# Patient Record
Sex: FEMALE | Race: WHITE | HISPANIC OR LATINO | URBAN - METROPOLITAN AREA
[De-identification: names, ages, dates, MRNs, and addresses within clinical notes are randomized per-mention and may not be internally consistent; named-entity substitution may affect disease eponyms.]

---

## 2019-02-20 ENCOUNTER — EMERGENCY (EMERGENCY)
Facility: HOSPITAL | Age: 29
LOS: 1 days | End: 2019-02-20
Admitting: EMERGENCY MEDICINE
Payer: MEDICAID

## 2019-02-20 PROCEDURE — 99284 EMERGENCY DEPT VISIT MOD MDM: CPT

## 2019-02-20 PROCEDURE — 70450 CT HEAD/BRAIN W/O DYE: CPT | Mod: 26

## 2021-04-07 PROBLEM — Z00.00 ENCOUNTER FOR PREVENTIVE HEALTH EXAMINATION: Status: ACTIVE | Noted: 2021-04-07

## 2021-04-13 ENCOUNTER — APPOINTMENT (OUTPATIENT)
Dept: OBGYN | Facility: CLINIC | Age: 31
End: 2021-04-13

## 2021-04-21 ENCOUNTER — APPOINTMENT (OUTPATIENT)
Dept: OBGYN | Facility: CLINIC | Age: 31
End: 2021-04-21
Payer: MEDICAID

## 2021-04-21 VITALS
SYSTOLIC BLOOD PRESSURE: 110 MMHG | BODY MASS INDEX: 26.58 KG/M2 | WEIGHT: 150 LBS | HEIGHT: 63 IN | DIASTOLIC BLOOD PRESSURE: 78 MMHG

## 2021-04-21 DIAGNOSIS — G43.009 MIGRAINE W/OUT AURA, NOT INTRACTABLE, W/OUT STATUS MIGRAINOSUS: ICD-10-CM

## 2021-04-21 DIAGNOSIS — Z78.9 OTHER SPECIFIED HEALTH STATUS: ICD-10-CM

## 2021-04-21 DIAGNOSIS — Z30.09 ENCOUNTER FOR OTHER GENERAL COUNSELING AND ADVICE ON CONTRACEPTION: ICD-10-CM

## 2021-04-21 PROCEDURE — 99072 ADDL SUPL MATRL&STAF TM PHE: CPT

## 2021-04-21 PROCEDURE — 99202 OFFICE O/P NEW SF 15 MIN: CPT

## 2021-04-21 RX ORDER — IBUPROFEN 200 MG/1
TABLET, COATED ORAL
Refills: 0 | Status: ACTIVE | COMMUNITY

## 2023-02-23 ENCOUNTER — APPOINTMENT (OUTPATIENT)
Dept: ANTEPARTUM | Facility: CLINIC | Age: 33
End: 2023-02-23
Payer: MEDICAID

## 2023-02-23 ENCOUNTER — ASOB RESULT (OUTPATIENT)
Age: 33
End: 2023-02-23

## 2023-02-23 PROBLEM — Z00.00 ENCOUNTER FOR PREVENTIVE HEALTH EXAMINATION: Status: ACTIVE | Noted: 2023-02-23

## 2023-02-23 PROCEDURE — 76811 OB US DETAILED SNGL FETUS: CPT

## 2023-02-23 PROCEDURE — 76817 TRANSVAGINAL US OBSTETRIC: CPT

## 2023-02-27 ENCOUNTER — APPOINTMENT (OUTPATIENT)
Dept: MATERNAL FETAL MEDICINE | Facility: CLINIC | Age: 33
End: 2023-02-27
Payer: MEDICAID

## 2023-02-27 ENCOUNTER — ASOB RESULT (OUTPATIENT)
Age: 33
End: 2023-02-27

## 2023-02-27 ENCOUNTER — APPOINTMENT (OUTPATIENT)
Dept: ANTEPARTUM | Facility: CLINIC | Age: 33
End: 2023-02-27
Payer: MEDICAID

## 2023-02-27 VITALS
OXYGEN SATURATION: 98 % | WEIGHT: 170 LBS | HEART RATE: 96 BPM | RESPIRATION RATE: 16 BRPM | DIASTOLIC BLOOD PRESSURE: 76 MMHG | HEIGHT: 64 IN | BODY MASS INDEX: 29.02 KG/M2 | SYSTOLIC BLOOD PRESSURE: 114 MMHG

## 2023-02-27 DIAGNOSIS — Z71.85 ENCOUNTER FOR IMMUNIZATION SAFETY COUNSELING: ICD-10-CM

## 2023-02-27 DIAGNOSIS — R10.2 OTHER SPECIFIED PREGNANCY RELATED CONDITIONS, SECOND TRIMESTER: ICD-10-CM

## 2023-02-27 DIAGNOSIS — Z3A.25 25 WEEKS GESTATION OF PREGNANCY: ICD-10-CM

## 2023-02-27 DIAGNOSIS — O09.32 SUPERVISION OF PREGNANCY WITH INSUFFICIENT ANTENATAL CARE, SECOND TRIMESTER: ICD-10-CM

## 2023-02-27 DIAGNOSIS — Z78.9 OTHER SPECIFIED HEALTH STATUS: ICD-10-CM

## 2023-02-27 DIAGNOSIS — O26.872 CERVICAL SHORTENING, SECOND TRIMESTER: ICD-10-CM

## 2023-02-27 DIAGNOSIS — O26.892 OTHER SPECIFIED PREGNANCY RELATED CONDITIONS, SECOND TRIMESTER: ICD-10-CM

## 2023-02-27 DIAGNOSIS — Z86.32 SUPERVISION OF PREGNANCY WITH OTHER POOR REPRODUCTIVE OR OBSTETRIC HISTORY, UNSPECIFIED TRIMESTER: ICD-10-CM

## 2023-02-27 DIAGNOSIS — O34.32 MATERNAL CARE FOR CERVICAL INCOMPETENCE, SECOND TRIMESTER: ICD-10-CM

## 2023-02-27 DIAGNOSIS — Z84.1 FAMILY HISTORY OF DISORDERS OF KIDNEY AND URETER: ICD-10-CM

## 2023-02-27 DIAGNOSIS — O09.299 SUPERVISION OF PREGNANCY WITH OTHER POOR REPRODUCTIVE OR OBSTETRIC HISTORY, UNSPECIFIED TRIMESTER: ICD-10-CM

## 2023-02-27 PROCEDURE — 76817 TRANSVAGINAL US OBSTETRIC: CPT

## 2023-02-27 PROCEDURE — 99205 OFFICE O/P NEW HI 60 MIN: CPT

## 2023-02-27 RX ORDER — PROGESTERONE 90 MG/1.125G
8 GEL VAGINAL DAILY
Qty: 2 | Refills: 4 | Status: ACTIVE | COMMUNITY
Start: 2023-02-27 | End: 1900-01-01

## 2023-02-27 RX ORDER — PRENATAL VIT NO.126/IRON/FOLIC 28MG-0.8MG
28-0.8 TABLET ORAL
Refills: 0 | Status: ACTIVE | COMMUNITY

## 2023-02-27 RX ORDER — PROGESTERONE 100 MG/1
100 INSERT VAGINAL
Qty: 60 | Refills: 2 | Status: ACTIVE | COMMUNITY
Start: 2023-02-27 | End: 1900-01-01

## 2023-02-27 NOTE — CHIEF COMPLAINT
[G ___] : G [unfilled] [P ___] : P [unfilled] [de-identified] : having cervical shortening in during a second trimester ultrasound examination and presenting late for prenatal care

## 2023-02-27 NOTE — ACTIVE PROBLEMS
[Diabetes Mellitus] : no diabetes mellitus [Hypertension] : no hypertension [Heart Disease] : no heart disease [Autoimmune Disease] : no autoimmune disease [Renal Disease] : no kidney disease, no UTI [Neurologic Disorder] : no neurologic disorder, no epilepsy [Psychiatric Disorders] : no psychiatric disorders [Depression] : no depression, no post partum depression [Hepatic Disorder] : no hepatitis, no liver disease [Thrombophlebitis] : no varicosities, no phlebitis [Trauma] : no trauma/violence [Thyroid Disorder] : no thyroid dysfunction [Blood Transfusion (___ Ml)] : no history of blood transfusion

## 2023-02-27 NOTE — DISCUSSION/SUMMARY
[FreeTextEntry1] : She is 25 weeks and 4 days gestation by her last menstrual period dates.\par \par Regarding her late entry into prenatal care, I told her that early prenatal care can identify risk factors and provide opportunities for early intervention to lower  birth rates and other pregnancy complications.\par \par Her first ultrasound was on 2023.  The fetal anatomy was reported to be within normal limits.  The cervix was reported to be short with cervical funneling.  The cervical length was reported as 0.6 cm.  Today's endovaginal ultrasound revealed cervical funneling and a short cervical length. I told her that cervical funneling and a short cervix increases the risk for having a  delivery. I told her that the  treatment for a short cervix in women with a gonzalez gestation without a prior spontaneous  birth is the administration of daily vaginal progesterone supplementation. The presence of dynamic cervical funneling and a short cervix at 25 weeks is not suggestive of cervical insufficiency.  Her gestational age is too advanced for the placement of an ultrasound indicated cerclage procedure. I told her that to my knowledge, there are no studies demonstrating that absolute bedrest prevents  birth.  Observing absolute bedrest can be associated with deep venous thrombosis and pulmonary embolism.   She was advised to limit her activities and not have sexual intercourse. I told her to avoid standing for prolonged periods of time, avoid heavy lifting and bending, and avoid emotional stress. She states that for the past 2 weeks she has been having intermittent back pain, pelvic pressure, and abdominal tightening.  She also complained of having a vaginal discharge.  I performed a speculum examination which revealed the external os to be minimally dilated with no visible membranes protruding from the external cervical os.  Normal leukorrhea seen. An affirm test was done in the office due to the history of vaginal discharge.  She was advised to present to the hospital if she developed increasing pelvic pressure, back pain, copious vaginal discharge, leaking of vaginal fluid, vaginal bleeding, abdominal tightening and abdominal cramping pain.   I gave her a prescription for Crinone 8% progesterone vaginal gel to apply to the vagina daily during the morning. She was advised to call my office if she has difficulties obtaining the vaginal progesterone medication. I recommended having a short course of oral Indomethacin therapy due to her history of back pain, pelvic pressure, and abdominal tightening with cervical funneling.  She was given a prescription for the indomethacin medication.  She was scheduled to return in 1 week for a cervical length examination. A Doppler study of the ductus arteriosus will be done when she returns next week for the cervical length measurement. \par \par I recommended having a glucola challenge test to screen for gestational diabetes as soon as possible due to the history of gestational diabetes during the prior pregnancy.  I recommend having the COVID 19 vaccine during pregnancy if not already vaccinated.  She told me that she was given the Pfizer vaccines during . I also recommend having a COVID 19 vaccine booster during pregnancy if vaccinated and no booster vaccine after 6 months from the last vaccine injection.  She has not had a COVID-19 vaccine booster.  I discussed the benefits and safety of having a COVID-19 vaccine booster during pregnancy.  I gave her literature in Turkish regarding the safety and benefits of having the COVID-19 vaccine during pregnancy.  She told me that she would like to have the COVID-19 vaccine booster as soon as possible.  She was advised to inquire at the [facility] regarding getting the COVID-19 booster vaccination or to go to her local pharmacy.  I recommended having the Tdap vaccine between 27 and 36 weeks of gestation to prevent pertussis infection in the  infant. I recommended having the flu vaccine during flu season (October through May).  She told me that she has not received the flu vaccine.  She should have serial ultrasounds to evaluate fetal growth and development.  \par

## 2023-02-27 NOTE — FAMILY HISTORY
[Age 35+ During Pregnancy] : not 35 or over during pregnancy [Reported Family History Of Birth Defects] : no congenital heart defects [Chris-Sachs Carrier] : no Chris-Sachs [Family History] : no mental retardation/autism [Reported Family History Of Genetic Disease] : no history of child defect in child of baby father

## 2023-02-27 NOTE — VITALS
[LMP (date): ___] : LMP was on [unfilled] [GA =___ Weeks] : which calculates to a GA of [unfilled] weeks [GA= ___ Days] : and [unfilled] day(s) [EMPERATRIZ by LMP (date): ___] : The calculated EMPERATRIZ by LMP is [unfilled] [By LMP] : this is the final EMPERATRIZ

## 2023-02-27 NOTE — OB HISTORY
[Pregnancy History] : patient received anesthesia [LMP: ___] : LMP: [unfilled] [EMPERATRIZ: ___] : EMPERATRIZ: [unfilled] [EGA: ___ wks] : EGA: [unfilled] wks [Spontaneous] : Spontaneous conception [Sonogram] : sonogram [at ___ wks] : at [unfilled] weeks [Definite:  ___ (Date)] : the last menstrual period was [unfilled] [Normal Amount/Duration] : was of a normal amount and duration [Regular Cycle Intervals] : periods have been regular [Frequency: Q ___ days] : menstrual periods occur approximately every [unfilled] days [Menarche Age: ____] : age at menarche was [unfilled] [___] : at [unfilled] weeks GA [FreeTextEntry1] : The first prenatal visit was on 2/8/2023.  First ultrasound examination was on 2/23/2023. [Spotting Between  Menses] : no spotting between menses [Menstrual Cramps] : no menstrual cramps [On BCP at conception] : the patient was not on BCP at conception

## 2023-03-01 LAB
CANDIDA VAG CYTO: NOT DETECTED
G VAGINALIS+PREV SP MTYP VAG QL MICRO: NOT DETECTED
T VAGINALIS VAG QL WET PREP: NOT DETECTED

## 2023-03-01 RX ORDER — INDOMETHACIN 25 MG/1
25 CAPSULE ORAL
Qty: 30 | Refills: 0 | Status: ACTIVE | COMMUNITY
Start: 2023-02-27 | End: 1900-01-01

## 2023-03-08 ENCOUNTER — APPOINTMENT (OUTPATIENT)
Dept: MATERNAL FETAL MEDICINE | Facility: CLINIC | Age: 33
End: 2023-03-08

## 2023-03-08 ENCOUNTER — ASOB RESULT (OUTPATIENT)
Age: 33
End: 2023-03-08

## 2023-03-08 ENCOUNTER — INPATIENT (INPATIENT)
Facility: HOSPITAL | Age: 33
LOS: 1 days | Discharge: HOME | DRG: 831 | End: 2023-03-10
Attending: OBSTETRICS & GYNECOLOGY | Admitting: OBSTETRICS & GYNECOLOGY
Payer: COMMERCIAL

## 2023-03-08 ENCOUNTER — APPOINTMENT (OUTPATIENT)
Dept: ANTEPARTUM | Facility: CLINIC | Age: 33
End: 2023-03-08
Payer: MEDICAID

## 2023-03-08 VITALS
HEART RATE: 105 BPM | WEIGHT: 167.99 LBS | RESPIRATION RATE: 16 BRPM | DIASTOLIC BLOOD PRESSURE: 62 MMHG | HEIGHT: 68.11 IN | SYSTOLIC BLOOD PRESSURE: 108 MMHG | TEMPERATURE: 98 F

## 2023-03-08 DIAGNOSIS — O26.893 OTHER SPECIFIED PREGNANCY RELATED CONDITIONS, THIRD TRIMESTER: ICD-10-CM

## 2023-03-08 DIAGNOSIS — Z98.890 OTHER SPECIFIED POSTPROCEDURAL STATES: Chronic | ICD-10-CM

## 2023-03-08 DIAGNOSIS — O47.02 FALSE LABOR BEFORE 37 COMPLETED WEEKS OF GESTATION, SECOND TRIMESTER: ICD-10-CM

## 2023-03-08 DIAGNOSIS — O34.30 MATERNAL CARE FOR CERVICAL INCOMPETENCE, UNSPECIFIED TRIMESTER: ICD-10-CM

## 2023-03-08 DIAGNOSIS — Z3A.26 26 WEEKS GESTATION OF PREGNANCY: ICD-10-CM

## 2023-03-08 DIAGNOSIS — Z00.00 ENCOUNTER FOR GENERAL ADULT MEDICAL EXAMINATION WITHOUT ABNORMAL FINDINGS: ICD-10-CM

## 2023-03-08 LAB
APPEARANCE UR: CLEAR — SIGNIFICANT CHANGE UP
BACTERIA # UR AUTO: ABNORMAL
BASOPHILS # BLD AUTO: 0.02 K/UL — SIGNIFICANT CHANGE UP (ref 0–0.2)
BASOPHILS NFR BLD AUTO: 0.2 % — SIGNIFICANT CHANGE UP (ref 0–2)
BILIRUB UR-MCNC: NEGATIVE — SIGNIFICANT CHANGE UP
BLD GP AB SCN SERPL QL: SIGNIFICANT CHANGE UP
COLOR SPEC: YELLOW — SIGNIFICANT CHANGE UP
DIFF PNL FLD: NEGATIVE — SIGNIFICANT CHANGE UP
EOSINOPHIL # BLD AUTO: 0.46 K/UL — SIGNIFICANT CHANGE UP (ref 0–0.5)
EOSINOPHIL NFR BLD AUTO: 4.7 % — SIGNIFICANT CHANGE UP (ref 0–6)
EPI CELLS # UR: ABNORMAL
GLUCOSE UR QL: NEGATIVE MG/DL — SIGNIFICANT CHANGE UP
HBV SURFACE AG SERPL QL IA: SIGNIFICANT CHANGE UP
HCT VFR BLD CALC: 35 % — SIGNIFICANT CHANGE UP (ref 34.5–45)
HGB BLD-MCNC: 12.1 G/DL — SIGNIFICANT CHANGE UP (ref 11.5–15.5)
HIV 1 & 2 AB SERPL IA.RAPID: SIGNIFICANT CHANGE UP
IMM GRANULOCYTES NFR BLD AUTO: 0.4 % — SIGNIFICANT CHANGE UP (ref 0–0.9)
KETONES UR-MCNC: NEGATIVE — SIGNIFICANT CHANGE UP
LEUKOCYTE ESTERASE UR-ACNC: ABNORMAL
LYMPHOCYTES # BLD AUTO: 2.69 K/UL — SIGNIFICANT CHANGE UP (ref 1–3.3)
LYMPHOCYTES # BLD AUTO: 27.6 % — SIGNIFICANT CHANGE UP (ref 13–44)
MCHC RBC-ENTMCNC: 30.1 PG — SIGNIFICANT CHANGE UP (ref 27–34)
MCHC RBC-ENTMCNC: 34.6 GM/DL — SIGNIFICANT CHANGE UP (ref 32–36)
MCV RBC AUTO: 87.1 FL — SIGNIFICANT CHANGE UP (ref 80–100)
MONOCYTES # BLD AUTO: 0.55 K/UL — SIGNIFICANT CHANGE UP (ref 0–0.9)
MONOCYTES NFR BLD AUTO: 5.6 % — SIGNIFICANT CHANGE UP (ref 2–14)
NEUTROPHILS # BLD AUTO: 5.99 K/UL — SIGNIFICANT CHANGE UP (ref 1.8–7.4)
NEUTROPHILS NFR BLD AUTO: 61.5 % — SIGNIFICANT CHANGE UP (ref 43–77)
NITRITE UR-MCNC: NEGATIVE — SIGNIFICANT CHANGE UP
PH UR: 7 — SIGNIFICANT CHANGE UP (ref 5–8)
PLATELET # BLD AUTO: 206 K/UL — SIGNIFICANT CHANGE UP (ref 150–400)
PROT UR-MCNC: NEGATIVE — SIGNIFICANT CHANGE UP
RBC # BLD: 4.02 M/UL — SIGNIFICANT CHANGE UP (ref 3.8–5.2)
RBC # FLD: 12.5 % — SIGNIFICANT CHANGE UP (ref 10.3–14.5)
RBC CASTS # UR COMP ASSIST: ABNORMAL /HPF (ref 0–4)
SARS-COV-2 RNA SPEC QL NAA+PROBE: DETECTED
SP GR SPEC: 1 — LOW (ref 1.01–1.02)
UROBILINOGEN FLD QL: NEGATIVE MG/DL — SIGNIFICANT CHANGE UP
WBC # BLD: 9.75 K/UL — SIGNIFICANT CHANGE UP (ref 3.8–10.5)
WBC # FLD AUTO: 9.75 K/UL — SIGNIFICANT CHANGE UP (ref 3.8–10.5)
WBC UR QL: SIGNIFICANT CHANGE UP /HPF (ref 0–5)

## 2023-03-08 PROCEDURE — 76817 TRANSVAGINAL US OBSTETRIC: CPT

## 2023-03-08 PROCEDURE — 93325 DOPPLER ECHO COLOR FLOW MAPG: CPT

## 2023-03-08 PROCEDURE — 76827 ECHO EXAM OF FETAL HEART: CPT

## 2023-03-08 RX ORDER — SODIUM CHLORIDE 9 MG/ML
1000 INJECTION, SOLUTION INTRAVENOUS
Refills: 0 | Status: COMPLETED | OUTPATIENT
Start: 2023-03-08 | End: 2023-03-08

## 2023-03-08 RX ORDER — PROGESTERONE 200 MG/1
100 CAPSULE, LIQUID FILLED ORAL AT BEDTIME
Refills: 0 | Status: DISCONTINUED | OUTPATIENT
Start: 2023-03-08 | End: 2023-03-09

## 2023-03-08 RX ADMIN — Medication 12 MILLIGRAM(S): at 18:33

## 2023-03-08 RX ADMIN — Medication 1 TABLET(S): at 19:02

## 2023-03-08 RX ADMIN — PROGESTERONE 100 MILLIGRAM(S): 200 CAPSULE, LIQUID FILLED ORAL at 22:45

## 2023-03-08 RX ADMIN — SODIUM CHLORIDE 100 MILLILITER(S): 9 INJECTION, SOLUTION INTRAVENOUS at 18:59

## 2023-03-08 NOTE — OB PROVIDER H&P - ATTENDING COMMENTS
32y  at 26w6d GA by LMP consistent with first trimester franca who is admitted after MFM appointment where she was found to have a internal os that appears dilated, confirmed with exam here in the setting of painless cervical dilation    -admit  -BMZ course  -MFM consult  -regular diet  -routine AP orders  -consents to be obtained  ppalos

## 2023-03-08 NOTE — CONSULT NOTE ADULT - PROBLEM SELECTOR RECOMMENDATION 9
-No labor complaints   -Prenatal labs ordered   -A1C pending   -NST q shift   -EFW 917g on   -ACS started as patient is at a high risk of  delivery   -MgSO4 for neuroprotection at this time as low suspicion for  delivery in 24 hours

## 2023-03-08 NOTE — CONSULT NOTE ADULT - ASSESSMENT
A/P: 32y  at 26w6d GA by LMP consistent with first trimester sono who is admitted for ACS in the setting of painless cervical dilation.     Discussed with Dr. Zamudio and Dr. Schilling.

## 2023-03-08 NOTE — OB PROVIDER H&P - NSHPPHYSICALEXAM_GEN_ALL_CORE
T(C): 36.8 (03-08-23 @ 17:30), Max: 36.8 (03-08-23 @ 17:30)  HR: 105 (03-08-23 @ 17:30) (105 - 105)  BP: 108/62 (03-08-23 @ 17:30) (108/62 - 108/62)  RR: 16 (03-08-23 @ 17:30) (16 - 16)    Gen: NAD, well-appearing   Abd: Soft, gravid  Ext: non-tender, non-edematous  SVE: 1.5/30/-3  SSE: cervix visualized, unable to see dilation due to vaginal walls, no bleeding or abnormal discharge noted   FHT: baseline 140s, moderate variability, +accels, -decels   [place]: no contractions

## 2023-03-08 NOTE — CONSULT NOTE ADULT - PROBLEM SELECTOR RECOMMENDATION 2
-No labor complaints   -Last cervical length 3.7cm today   -SVE 1.5/-3, now with painless cervical dilation   -NST q shift   -Vaginal cultures done  -UA and UCx done   -Continue progesterone suppository daily   -ACS started as patient is at a high risk of  delivery   -MgSO4 for neuroprotection at this time as low suspicion for  delivery in 24 hours

## 2023-03-08 NOTE — CONSULT NOTE ADULT - SUBJECTIVE AND OBJECTIVE BOX
32y  at 26w6d GA by LMP consistent with first trimester sono who was sent in from the office for ACS in the setting of cervical insufficiency. Patient denies vaginal bleeding, contractions and leakage of fluid. She endorses good fetal movement. Denies fevers, chills, nausea and vomiting. No other complaints at this time.   EMPERATRIZ: 23  LMP: 22  Prenatal course is significant for:  1. Shortened cervix: 0.5cm on TVUS on , s/p indomethacin for 1 week, progesterone unable to be obtained from pharmacy by patient   2. H/o GDM in prior pregnancy     POB:   G1 SAB with D&C   G2 full term uncomplicated    G3 full term uncomplicated    G4 full term uncomplicated , pregnancy significant for GDM  PGYN: -fibroids, -ovarian cysts, denies STD hx, denies abnormal PAPs   PMH: Denies  PSH: Denies  SH: Denies EtOH, tobacco and illicit drug use during this pregnancy; feels safe at home   Meds: PNVs  Allergies: NKDA    T(C): 36.8 (23 @ 17:30), Max: 36.8 (23 @ 17:30)  HR: 105 (23 @ 17:30) (105 - 105)  BP: 108/62 (23 @ 17:30) (108/62 - 108/62)  RR: 16 (23 @ 17:30) (16 - 16)    Gen: NAD, well-appearing   Abd: Soft, gravid  Ext: non-tender, non-edematous  SVE: 1.5/30/-3  SSE: cervix visualized, unable to see dilation due to vaginal walls, no bleeding or abnormal discharge noted   FHT: baseline 140s, moderate variability, +accels, -decels   [place]: no contractions    LABS:  PENDING

## 2023-03-08 NOTE — OB PROVIDER H&P - HISTORY OF PRESENT ILLNESS
32y  at 26w6d GA by LMP consistent with first trimester sono who was sent in from the office for ACS in the setting of cervical insufficiency. Patient denies vaginal bleeding, contractions and leakage of fluid. She endorses good fetal movement. Denies fevers, chills, nausea and vomiting. No other complaints at this time.   EMPERATRIZ: 23  LMP: 22  Prenatal course is significant for:  1. Shortened cervix: 0.5cm on TVUS on , s/p indomethacin for 1 week, progesterone unable to be obtained from pharmacy by patient   2. H/o GDM in prior pregnancy     POB:   G1 SAB with D&C   G2 full term uncomplicated    G3 full term uncomplicated    G4 full term uncomplicated , pregnancy significant for GDM  PGYN: -fibroids, -ovarian cysts, denies STD hx, denies abnormal PAPs   PMH: Denies  PSH: Denies  SH: Denies EtOH, tobacco and illicit drug use during this pregnancy; feels safe at home   Meds: PNVs  Allergies: NKDA

## 2023-03-08 NOTE — OB PROVIDER H&P - ASSESSMENT
A/P: 32y  at 26w6d GA by LMP consistent with first trimester sono who is admitted for ACS in the setting of painless cervical dilation.   -Admit to L&D  -Consent  -Admission labs, prenatal labs, A1C   -Regular diet   -Reactive NST, no contractions on toco   -ACS started   -MFM consulted     Discussed with Dr. Walker.

## 2023-03-08 NOTE — OB RN PATIENT PROFILE - FALL HARM RISK - UNIVERSAL INTERVENTIONS
-- Message is from the [facility]--    Patient is requesting a medication refill - the medication was not listed on the patient's active medication list.    Mom states when she barely touches the ear it hurts the patient. Mom states that a voice message can be left if she doesn't answer. Mom states that she has bad reception where she is at.      Prescribing Provider: Germania Lama MD    RX Name:  CIprodex  Dose:  0.3-0.1 % Otic Suspension  Quantity:  Npt p[rovided  Instruction(s):  Instill 4 drops in the affected ears twice daily    Duration: n/a days    Pharmacy  [facility] [address] [facility] [place] [address] [facility]    Patient confirmed the above pharmacy as correct?  Yes    Caller Information       Type Contact Phone    07/03/2019 08:10 AM Phone (Incoming) IVETTE FOSS (Mother) [phone] (W)          Alternative phone number: [phone] Please leave a voice message if no answer    Turnaround time given to caller:   \"This message will be sent to [state Provider's name]. The clinical team will fulfill your request as soon as they review your message.\"  
Bed in lowest position, wheels locked, appropriate side rails in place/Call bell, personal items and telephone in reach/Instruct patient to call for assistance before getting out of bed or chair/Non-slip footwear when patient is out of bed/[place] to call system/Physically safe environment - no spills, clutter or unnecessary equipment/Purposeful Proactive Rounding/Room/bathroom lighting operational, light cord in reach

## 2023-03-09 ENCOUNTER — TRANSCRIPTION ENCOUNTER (OUTPATIENT)
Age: 33
End: 2023-03-09

## 2023-03-09 DIAGNOSIS — Z3A.27 27 WEEKS GESTATION OF PREGNANCY: ICD-10-CM

## 2023-03-09 LAB
C TRACH RRNA SPEC QL NAA+PROBE: SIGNIFICANT CHANGE UP
CANDIDA AB TITR SER: SIGNIFICANT CHANGE UP
COVID-19 SPIKE DOMAIN AB INTERP: POSITIVE
COVID-19 SPIKE DOMAIN ANTIBODY RESULT: >250 U/ML — HIGH
CULTURE RESULTS: SIGNIFICANT CHANGE UP
G VAGINALIS DNA SPEC QL NAA+PROBE: SIGNIFICANT CHANGE UP
HIV 1+2 AB+HIV1 P24 AG SERPL QL IA: SIGNIFICANT CHANGE UP
N GONORRHOEA RRNA SPEC QL NAA+PROBE: SIGNIFICANT CHANGE UP
RUBV IGG SER-ACNC: 14.3 INDEX — SIGNIFICANT CHANGE UP
RUBV IGG SER-IMP: POSITIVE — SIGNIFICANT CHANGE UP
SARS-COV-2 IGG+IGM SERPL QL IA: >250 U/ML — HIGH
SARS-COV-2 IGG+IGM SERPL QL IA: POSITIVE
SPECIMEN SOURCE: SIGNIFICANT CHANGE UP
SPECIMEN SOURCE: SIGNIFICANT CHANGE UP
T PALLIDUM AB TITR SER: NEGATIVE — SIGNIFICANT CHANGE UP
T VAGINALIS SPEC QL WET PREP: SIGNIFICANT CHANGE UP

## 2023-03-09 PROCEDURE — 99232 SBSQ HOSP IP/OBS MODERATE 35: CPT | Mod: GC

## 2023-03-09 RX ORDER — ACETAMINOPHEN 500 MG
975 TABLET ORAL EVERY 6 HOURS
Refills: 0 | Status: DISCONTINUED | OUTPATIENT
Start: 2023-03-09 | End: 2023-03-10

## 2023-03-09 RX ORDER — FAMOTIDINE 10 MG/ML
20 INJECTION INTRAVENOUS DAILY
Refills: 0 | Status: DISCONTINUED | OUTPATIENT
Start: 2023-03-09 | End: 2023-03-10

## 2023-03-09 RX ORDER — CALCIUM CARBONATE 500(1250)
1 TABLET ORAL EVERY 4 HOURS
Refills: 0 | Status: DISCONTINUED | OUTPATIENT
Start: 2023-03-09 | End: 2023-03-10

## 2023-03-09 RX ORDER — LANOLIN ALCOHOL/MO/W.PET/CERES
1 CREAM (GRAM) TOPICAL AT BEDTIME
Refills: 0 | Status: DISCONTINUED | OUTPATIENT
Start: 2023-03-09 | End: 2023-03-10

## 2023-03-09 RX ORDER — DIPHENHYDRAMINE HCL 50 MG
25 CAPSULE ORAL ONCE
Refills: 0 | Status: COMPLETED | OUTPATIENT
Start: 2023-03-09 | End: 2023-03-09

## 2023-03-09 RX ORDER — ACETAMINOPHEN 500 MG
975 TABLET ORAL ONCE
Refills: 0 | Status: COMPLETED | OUTPATIENT
Start: 2023-03-09 | End: 2023-03-09

## 2023-03-09 RX ADMIN — Medication 975 MILLIGRAM(S): at 02:03

## 2023-03-09 RX ADMIN — Medication 25 MILLIGRAM(S): at 23:13

## 2023-03-09 RX ADMIN — Medication 975 MILLIGRAM(S): at 03:00

## 2023-03-09 RX ADMIN — Medication 12 MILLIGRAM(S): at 17:36

## 2023-03-09 RX ADMIN — Medication 1 TABLET(S): at 11:34

## 2023-03-09 RX ADMIN — Medication 975 MILLIGRAM(S): at 17:51

## 2023-03-09 RX ADMIN — Medication 975 MILLIGRAM(S): at 11:34

## 2023-03-09 RX ADMIN — FAMOTIDINE 20 MILLIGRAM(S): 10 INJECTION INTRAVENOUS at 12:50

## 2023-03-09 RX ADMIN — Medication 1 TABLET(S): at 11:35

## 2023-03-09 NOTE — DISCHARGE NOTE ANTEPARTUM - PLAN OF CARE
Patient should transition to regular activity level. Resume regular diet. Please call your provider to schedule follow-up visit. If you develop painful contractions, have vaginal bleeding, LOF or decreased fetal movement, please call your provider or return to the hospital.

## 2023-03-09 NOTE — DISCHARGE NOTE ANTEPARTUM - CARE PLAN
Principal Discharge DX:	Cervical insufficiency in pregnancy, antepartum  Assessment and plan of treatment:	Patient should transition to regular activity level. Resume regular diet. Please call your provider to schedule follow-up visit. If you develop painful contractions, have vaginal bleeding, LOF or decreased fetal movement, please call your provider or return to the hospital.   1

## 2023-03-09 NOTE — DISCHARGE NOTE ANTEPARTUM - MEDICATION SUMMARY - MEDICATIONS TO TAKE
I will START or STAY ON the medications listed below when I get home from the hospital:    Pepcid 20 mg oral tablet  -- 1 tab(s) by mouth once a day   -- It is very important that you take or use this exactly as directed.  Do not skip doses or discontinue unless directed by your doctor.  Obtain medical advice before taking any non-prescription drugs as some may affect the action of this medication.    -- Indication: For 27 weeks gestation of pregnancy    Prenatal Multivitamins with Folic Acid 1 mg oral tablet  -- 1 tab(s) by mouth once a day  -- Indication: For 27 weeks gestation of pregnancy

## 2023-03-09 NOTE — PROGRESS NOTE ADULT - PROBLEM SELECTOR PLAN 1
-No labor complaints   -H/o short cervix this pregnancy- 0.5cm on TVUS on , s/p indomethacin for 1 week, progesterone unable to be obtained from pharmacy by patient. Continue progesterone suppository daily   -SVE 1./-3, now with painless cervical dilation   -NST q shift   -Vaginal cultures done- f/u  -UA clear; UCx pending  -ACS started as patient is at a high risk of  delivery   -No MgSO4 for neuroprotection at this time as low suspicion for  delivery in 24 hours.

## 2023-03-09 NOTE — DISCHARGE NOTE ANTEPARTUM - CARE PROVIDER_API CALL
Arben Iniguez)  Obstetrics and Gynecology  [address] [place] [address]  Phone: [phone]  Fax: [phone]  Follow Up Time:

## 2023-03-09 NOTE — DISCHARGE NOTE ANTEPARTUM - HOSPITAL COURSE
32y  at 27.1wks GA by LMP consistent with first trimester sono admitted for ACS in the setting of painless cervical dilation. On the day of discharge pt is betamethasone complete, cervical exam is unchanged and  testing is reassuring. Pt is stable for discharge.

## 2023-03-09 NOTE — DISCHARGE NOTE ANTEPARTUM - PATIENT PORTAL LINK FT
You can access the FollowMyHealth Patient Portal offered by [facility] by registering at the following website: [email or web address]. By joining [employer]’s FollowMyHealth portal, you will also be able to view your health information using other applications (apps) compatible with our system.

## 2023-03-10 ENCOUNTER — TRANSCRIPTION ENCOUNTER (OUTPATIENT)
Age: 33
End: 2023-03-10

## 2023-03-10 VITALS
HEART RATE: 100 BPM | RESPIRATION RATE: 18 BRPM | DIASTOLIC BLOOD PRESSURE: 58 MMHG | SYSTOLIC BLOOD PRESSURE: 95 MMHG | OXYGEN SATURATION: 98 % | TEMPERATURE: 98 F

## 2023-03-10 LAB
A1C WITH ESTIMATED AVERAGE GLUCOSE RESULT: 4.8 % — SIGNIFICANT CHANGE UP (ref 4–5.6)
CULTURE RESULTS: SIGNIFICANT CHANGE UP
ESTIMATED AVERAGE GLUCOSE: 91 MG/DL — SIGNIFICANT CHANGE UP (ref 68–114)
MEV IGG SER-ACNC: 15.7 AU/ML — SIGNIFICANT CHANGE UP
MEV IGG+IGM SER-IMP: SIGNIFICANT CHANGE UP
SPECIMEN SOURCE: SIGNIFICANT CHANGE UP

## 2023-03-10 PROCEDURE — 86769 SARS-COV-2 COVID-19 ANTIBODY: CPT

## 2023-03-10 PROCEDURE — 87491 CHLMYD TRACH DNA AMP PROBE: CPT

## 2023-03-10 PROCEDURE — 86901 BLOOD TYPING SEROLOGIC RH(D): CPT

## 2023-03-10 PROCEDURE — 86900 BLOOD TYPING SEROLOGIC ABO: CPT

## 2023-03-10 PROCEDURE — 81001 URINALYSIS AUTO W/SCOPE: CPT

## 2023-03-10 PROCEDURE — 87086 URINE CULTURE/COLONY COUNT: CPT

## 2023-03-10 PROCEDURE — 85025 COMPLETE CBC W/AUTO DIFF WBC: CPT

## 2023-03-10 PROCEDURE — 36415 COLL VENOUS BLD VENIPUNCTURE: CPT

## 2023-03-10 PROCEDURE — U0005: CPT

## 2023-03-10 PROCEDURE — 87070 CULTURE OTHR SPECIMN AEROBIC: CPT

## 2023-03-10 PROCEDURE — 86703 HIV-1/HIV-2 1 RESULT ANTBDY: CPT

## 2023-03-10 PROCEDURE — 83036 HEMOGLOBIN GLYCOSYLATED A1C: CPT

## 2023-03-10 PROCEDURE — 87800 DETECT AGNT MULT DNA DIREC: CPT

## 2023-03-10 PROCEDURE — 87389 HIV-1 AG W/HIV-1&-2 AB AG IA: CPT

## 2023-03-10 PROCEDURE — 86850 RBC ANTIBODY SCREEN: CPT

## 2023-03-10 PROCEDURE — 86765 RUBEOLA ANTIBODY: CPT

## 2023-03-10 PROCEDURE — 87591 N.GONORRHOEAE DNA AMP PROB: CPT

## 2023-03-10 PROCEDURE — 86762 RUBELLA ANTIBODY: CPT

## 2023-03-10 PROCEDURE — 99231 SBSQ HOSP IP/OBS SF/LOW 25: CPT | Mod: GC

## 2023-03-10 PROCEDURE — 87340 HEPATITIS B SURFACE AG IA: CPT

## 2023-03-10 PROCEDURE — 86780 TREPONEMA PALLIDUM: CPT

## 2023-03-10 PROCEDURE — U0003: CPT

## 2023-03-10 RX ORDER — FAMOTIDINE 10 MG/ML
1 INJECTION INTRAVENOUS
Qty: 14 | Refills: 0
Start: 2023-03-10 | End: 2023-03-23

## 2023-03-10 NOTE — PROGRESS NOTE ADULT - PROBLEM SELECTOR PLAN 3
-No labor complaints   -Prenatal labs ordered   -H/o GDMA previous preg; A1C pending   -NST q shift   -EFW 917g on 2/24.
-No labor complaints   -Prenatal labs ordered   -H/o GDMA previous preg; A1C pending   -NST q shift   -EFW 917g on 2/24

## 2023-03-10 NOTE — CHART NOTE - NSCHARTNOTESELECT_GEN_ALL_CORE
NST Review/Event Note
Rx listed below.  Preferred pharmacy has been set up and verified.        
Event Note

## 2023-03-10 NOTE — DISCHARGE NOTE NURSING/CASE MANAGEMENT/SOCIAL WORK - PATIENT PORTAL LINK FT
You can access the FollowMyHealth Patient Portal offered by [facility] by registering at the following website: [email or web address]. By joining [employer]s FollowMyHealth portal, you will also be able to view your health information using other applications (apps) compatible with our system.

## 2023-03-10 NOTE — CHART NOTE - NSCHARTNOTEFT_GEN_A_CORE
32y  at 27.1wks GA by LMP consistent with first trimester sono who is admitted for ACS in the setting of painless cervical dilation.   Pt with no complaints. SSE: multiparous cervix visualized, without any signs of bleeding or drainage, no pooling. On digitial exam /-3.   No cervical changed noted from admission. Pt is stable for d/c home. Return precautions reviewed. Pt expressed understanding.
NST reviewed and approved by Dr. Walker  130/moderate variability/+accels/-decels

## 2023-03-10 NOTE — PROGRESS NOTE ADULT - ATTENDING COMMENTS
MFM - IUP at 27 weeks admitted for  corticosteroids secondary to progressive cervical shortening and painless cervical dilation.  Also noted to by COVID positive on admission testing. Denies symptoms or sick contacts.  This am patient reports a mild headache and heartburn which has been persistent throughout the pregnancy.  She reports no abdominal pain or pelvic pressure.  FHRT reassuring without contractions.  We reviewed the finding of short cervix and cervical dilation and increased risk for  delivery.  Plan to complete betamethasone.  No tocolysis or MGSO4 indicated at this time.  Vaginal cultures pending.  Recommend discontinue vaginal progesterone secondary to cervical dilation.  Regarding COVID+, patient does not have symptoms and unsure of any exposure.  We reviewed available of Paxlovid therapy to prevent progression of symptoms.  Patient declines at this time.  Continue supportive care.  Pepcid for heartburn and Tylenol for headache.  Continue inpatient management.   Evy Warren MD  Maternal Fetal Medicine
MFM Attending    32 year old  at admitted at 27 1/7 weeks with painless cervical dilation, received betamethasone course. Denies cramping, leaking, bleeding. Reports good fetal movement.    If cervical exam unchanged, plan to d/c home with close follow-up.  labor precautions discussed.    HAYDEN Wallace

## 2023-03-10 NOTE — DISCHARGE NOTE NURSING/CASE MANAGEMENT/SOCIAL WORK - NSDCPEFALRISK_GEN_ALL_CORE
For information on Fall & Injury Prevention, visit: [email or web address] OR  [email or web address] OR  https://www.cdc.gov/steadi/patient.html

## 2023-03-10 NOTE — PROGRESS NOTE ADULT - PROBLEM SELECTOR PLAN 1
-No labor complaints   -H/o short cervix this pregnancy- 0.5cm on TVUS on , s/p indomethacin for 1 week   -SVE 1.5/-3, now with painless cervical dilation   -NST q shift   -Vaginal cultures done  -UA clear; UCx neg  -ACS started as patient is at a high risk of  delivery   -No MgSO4 for neuroprotection at this time as low suspicion for  delivery in 24 hours.

## 2023-03-10 NOTE — PROGRESS NOTE ADULT - SUBJECTIVE AND OBJECTIVE BOX
JOSÉ ANTONIO SHOEMAKER  [facility] 2EST  01  32y  at 27.1wks GA by LMP consistent with first trimester sono who is admitted for ACS in the setting of painless cervical dilation.     Pt seen and examined. No complains. She denies any fevers, chills, sob, URI symptoms, ctx, VB, LOF and reports good fetal movement.      Vital Signs:  Vital Signs Last 24 Hrs  T(C): 37 (10 Mar 2023 05:50), Max: 37 (10 Mar 2023 05:50)  T(F): 98.6 (10 Mar 2023 05:50), Max: 98.6 (10 Mar 2023 05:50)  HR: 108 (10 Mar 2023 05:50) (70 - 108)  BP: 96/61 (10 Mar 2023 05:50) (94/55 - 116/75)  RR: 18 (10 Mar 2023 05:50) (18 - 18)  SpO2: 97% (10 Mar 2023 05:50) (95% - 100%)    Parameters below as of 10 Mar 2023 05:50  Patient On (Oxygen Delivery Method): room air        Physical Exam:  General: Adult female in NAD  Abdomen: soft, non-tender, gravid uterus  Ext: No cyanosis, edema or calf tenderness  Skin: No rashes or lesions on exposed skin        Labs:                          12.1   9.75  )-----------( 206      ( 08 Mar 2023 18:32 )             35.0             Culture - Genital (collected 23 @ 18:32)  Source: .Genital Vaginal/Rectal  Preliminary Report (23 @ 20:28):    Routine vaginal maureen    Culture - Urine (collected 23 @ 18:32)  Source: Clean Catch Clean Catch (Midstream)  Final Report (23 @ 22:30):    <10,000 CFU/mL Normal Urogenital Maureen          MEDICATIONS  (STANDING):  famotidine Injectable 20 milliGRAM(s) IV Push daily  melatonin 1 milliGRAM(s) Oral at bedtime  prenatal multivitamin 1 Tablet(s) Oral daily    MEDICATIONS  (PRN):  acetaminophen     Tablet .. 975 milliGRAM(s) Oral every 6 hours PRN Mild Pain (1 - 3)  calcium carbonate    500 mG (Tums) Chewable 1 Tablet(s) Chew every 4 hours PRN Heartburn  
JOSÉ ANTONIO SHOEMAKER  [facility] 2EST  01  32y  at 27wks GA by LMP consistent with first trimester sono who is admitted for ACS in the setting of painless cervical dilation.     Pt seen and examined. Reports migraine this am. She denies any fevers, chills, sob, ctx, VB, LOF and reports good fetal movement.       Vital Signs:  Vital Signs Last 24 Hrs  T(C): 36.7 (09 Mar 2023 08:11), Max: 37.1 (09 Mar 2023 04:22)  T(F): 98.1 (09 Mar 2023 08:11), Max: 98.8 (09 Mar 2023 04:22)  HR: 70 (09 Mar 2023 08:11) (70 - 106)  BP: 115/60 (09 Mar 2023 08:11) (104/64 - 115/60)  RR: 18 (09 Mar 2023 08:11) (16 - 18)  SpO2: 99% (09 Mar 2023 08:11) (98% - 99%)    Parameters below as of 09 Mar 2023 08:11  Patient On (Oxygen Delivery Method): room air      Height (cm): 173 (23 @ 17:29)  Weight (kg): 76.2 (23 @ 17:29)  BMI (kg/m2): 25.5 (23 @ 17:29)  BSA (m2): 1.9 (23 @ 17:29)    Physical Exam:  General: Adult female in NAD  Abdomen: soft, non-tender, gravid uterus  Ext: No cyanosis, edema or calf tenderness  Skin: No rashes or lesions on exposed skin      Labs:                          12.1   9.75  )-----------( 206      ( 08 Mar 2023 18:32 )             35.0         MEDICATIONS  (STANDING):  betamethasone Injectable 12 milliGRAM(s) IntraMuscular every 24 hours  prenatal multivitamin 1 Tablet(s) Oral daily  progesterone Vaginal Insert 100 milliGRAM(s) Vaginal at bedtime    MEDICATIONS  (PRN):  acetaminophen     Tablet .. 975 milliGRAM(s) Oral every 6 hours PRN Mild Pain (1 - 3)

## 2023-03-10 NOTE — PROGRESS NOTE ADULT - ASSESSMENT
32y  at 27wks GA by LMP consistent with first trimester sono who is admitted for ACS in the setting of painless cervical dilation. 
32y  at 27.1wks GA by LMP consistent with first trimester sono who is admitted for ACS in the setting of painless cervical dilation.

## 2023-03-15 PROBLEM — G43.909 MIGRAINE, UNSPECIFIED, NOT INTRACTABLE, WITHOUT STATUS MIGRAINOSUS: Chronic | Status: ACTIVE | Noted: 2023-03-08

## 2023-03-22 ENCOUNTER — APPOINTMENT (OUTPATIENT)
Dept: ANTEPARTUM | Facility: CLINIC | Age: 33
End: 2023-03-22
Payer: MEDICAID

## 2023-03-22 ENCOUNTER — ASOB RESULT (OUTPATIENT)
Age: 33
End: 2023-03-22

## 2023-03-22 PROCEDURE — 76817 TRANSVAGINAL US OBSTETRIC: CPT

## 2023-03-22 PROCEDURE — 76816 OB US FOLLOW-UP PER FETUS: CPT

## 2023-03-29 ENCOUNTER — INPATIENT (INPATIENT)
Facility: HOSPITAL | Age: 33
LOS: 3 days | Discharge: HOME | End: 2023-04-02
Attending: OBSTETRICS & GYNECOLOGY | Admitting: OBSTETRICS & GYNECOLOGY
Payer: COMMERCIAL

## 2023-03-29 VITALS — HEART RATE: 118 BPM

## 2023-03-29 DIAGNOSIS — Z98.890 OTHER SPECIFIED POSTPROCEDURAL STATES: Chronic | ICD-10-CM

## 2023-03-29 DIAGNOSIS — O60.03 PRETERM LABOR WITHOUT DELIVERY, THIRD TRIMESTER: ICD-10-CM

## 2023-03-29 DIAGNOSIS — Z3A.29 29 WEEKS GESTATION OF PREGNANCY: ICD-10-CM

## 2023-03-29 DIAGNOSIS — O26.893 OTHER SPECIFIED PREGNANCY RELATED CONDITIONS, THIRD TRIMESTER: ICD-10-CM

## 2023-03-29 DIAGNOSIS — O47.03 FALSE LABOR BEFORE 37 COMPLETED WEEKS OF GESTATION, THIRD TRIMESTER: ICD-10-CM

## 2023-03-29 LAB
AMPHET UR-MCNC: NEGATIVE — SIGNIFICANT CHANGE UP
APPEARANCE UR: ABNORMAL
BACTERIA # UR AUTO: ABNORMAL
BARBITURATES UR SCN-MCNC: NEGATIVE — SIGNIFICANT CHANGE UP
BASOPHILS # BLD AUTO: 0.02 K/UL — SIGNIFICANT CHANGE UP (ref 0–0.2)
BASOPHILS NFR BLD AUTO: 0.2 % — SIGNIFICANT CHANGE UP (ref 0–2)
BENZODIAZ UR-MCNC: NEGATIVE — SIGNIFICANT CHANGE UP
BILIRUB UR-MCNC: NEGATIVE — SIGNIFICANT CHANGE UP
BLD GP AB SCN SERPL QL: SIGNIFICANT CHANGE UP
C TRACH RRNA SPEC QL NAA+PROBE: SIGNIFICANT CHANGE UP
COCAINE METAB.OTHER UR-MCNC: NEGATIVE — SIGNIFICANT CHANGE UP
COLOR SPEC: YELLOW — SIGNIFICANT CHANGE UP
DIFF PNL FLD: ABNORMAL
EOSINOPHIL # BLD AUTO: 0.19 K/UL — SIGNIFICANT CHANGE UP (ref 0–0.5)
EOSINOPHIL NFR BLD AUTO: 1.5 % — SIGNIFICANT CHANGE UP (ref 0–6)
EPI CELLS # UR: SIGNIFICANT CHANGE UP
FIBRONECTIN FETAL SPEC QL: POSITIVE
GLUCOSE BLDC GLUCOMTR-MCNC: 90 MG/DL — SIGNIFICANT CHANGE UP (ref 70–99)
GLUCOSE UR QL: NEGATIVE MG/DL — SIGNIFICANT CHANGE UP
HCT VFR BLD CALC: 35.4 % — SIGNIFICANT CHANGE UP (ref 34.5–45)
HGB BLD-MCNC: 11.8 G/DL — SIGNIFICANT CHANGE UP (ref 11.5–15.5)
HIV 1 & 2 AB SERPL IA.RAPID: SIGNIFICANT CHANGE UP
IMM GRANULOCYTES NFR BLD AUTO: 0.5 % — SIGNIFICANT CHANGE UP (ref 0–0.9)
KETONES UR-MCNC: NEGATIVE — SIGNIFICANT CHANGE UP
LEUKOCYTE ESTERASE UR-ACNC: ABNORMAL
LYMPHOCYTES # BLD AUTO: 16.1 % — SIGNIFICANT CHANGE UP (ref 13–44)
LYMPHOCYTES # BLD AUTO: 2.07 K/UL — SIGNIFICANT CHANGE UP (ref 1–3.3)
MAGNESIUM SERPL-MCNC: 4.9 MG/DL — HIGH (ref 1.6–2.6)
MCHC RBC-ENTMCNC: 30.2 PG — SIGNIFICANT CHANGE UP (ref 27–34)
MCHC RBC-ENTMCNC: 33.3 GM/DL — SIGNIFICANT CHANGE UP (ref 32–36)
MCV RBC AUTO: 90.5 FL — SIGNIFICANT CHANGE UP (ref 80–100)
METHADONE UR-MCNC: NEGATIVE — SIGNIFICANT CHANGE UP
MONOCYTES # BLD AUTO: 0.88 K/UL — SIGNIFICANT CHANGE UP (ref 0–0.9)
MONOCYTES NFR BLD AUTO: 6.8 % — SIGNIFICANT CHANGE UP (ref 2–14)
N GONORRHOEA RRNA SPEC QL NAA+PROBE: SIGNIFICANT CHANGE UP
NEUTROPHILS # BLD AUTO: 9.64 K/UL — HIGH (ref 1.8–7.4)
NEUTROPHILS NFR BLD AUTO: 74.9 % — SIGNIFICANT CHANGE UP (ref 43–77)
NITRITE UR-MCNC: NEGATIVE — SIGNIFICANT CHANGE UP
OPIATES UR-MCNC: NEGATIVE — SIGNIFICANT CHANGE UP
PCP SPEC-MCNC: SIGNIFICANT CHANGE UP
PCP UR-MCNC: NEGATIVE — SIGNIFICANT CHANGE UP
PH UR: 7 — SIGNIFICANT CHANGE UP (ref 5–8)
PLATELET # BLD AUTO: 183 K/UL — SIGNIFICANT CHANGE UP (ref 150–400)
PROT UR-MCNC: NEGATIVE — SIGNIFICANT CHANGE UP
RBC # BLD: 3.91 M/UL — SIGNIFICANT CHANGE UP (ref 3.8–5.2)
RBC # FLD: 13.8 % — SIGNIFICANT CHANGE UP (ref 10.3–14.5)
RBC CASTS # UR COMP ASSIST: ABNORMAL /HPF (ref 0–4)
SP GR SPEC: 1 — LOW (ref 1.01–1.02)
SPECIMEN SOURCE: SIGNIFICANT CHANGE UP
T PALLIDUM AB TITR SER: NEGATIVE — SIGNIFICANT CHANGE UP
THC UR QL: NEGATIVE — SIGNIFICANT CHANGE UP
UROBILINOGEN FLD QL: NEGATIVE MG/DL — SIGNIFICANT CHANGE UP
WBC # BLD: 12.87 K/UL — HIGH (ref 3.8–10.5)
WBC # FLD AUTO: 12.87 K/UL — HIGH (ref 3.8–10.5)
WBC UR QL: ABNORMAL /HPF (ref 0–5)

## 2023-03-29 PROCEDURE — 99254 IP/OBS CNSLTJ NEW/EST MOD 60: CPT | Mod: GC

## 2023-03-29 RX ORDER — MORPHINE SULFATE 50 MG/1
2 CAPSULE, EXTENDED RELEASE ORAL ONCE
Refills: 0 | Status: DISCONTINUED | OUTPATIENT
Start: 2023-03-29 | End: 2023-03-29

## 2023-03-29 RX ORDER — SODIUM CHLORIDE 9 MG/ML
1000 INJECTION, SOLUTION INTRAVENOUS
Refills: 0 | Status: DISCONTINUED | OUTPATIENT
Start: 2023-03-29 | End: 2023-03-30

## 2023-03-29 RX ORDER — CITRIC ACID/SODIUM CITRATE 300-500 MG
30 SOLUTION, ORAL ORAL ONCE
Refills: 0 | Status: DISCONTINUED | OUTPATIENT
Start: 2023-03-29 | End: 2023-03-29

## 2023-03-29 RX ORDER — AMPICILLIN TRIHYDRATE 250 MG
1 CAPSULE ORAL EVERY 4 HOURS
Refills: 0 | Status: DISCONTINUED | OUTPATIENT
Start: 2023-03-29 | End: 2023-03-30

## 2023-03-29 RX ORDER — OXYTOCIN 10 UNIT/ML
333.33 VIAL (ML) INJECTION
Qty: 20 | Refills: 0 | Status: DISCONTINUED | OUTPATIENT
Start: 2023-03-29 | End: 2023-03-29

## 2023-03-29 RX ORDER — MAGNESIUM SULFATE 500 MG/ML
2 VIAL (ML) INJECTION
Qty: 40 | Refills: 0 | Status: DISCONTINUED | OUTPATIENT
Start: 2023-03-29 | End: 2023-04-02

## 2023-03-29 RX ORDER — MAGNESIUM SULFATE 500 MG/ML
2 VIAL (ML) INJECTION
Qty: 40 | Refills: 0 | Status: DISCONTINUED | OUTPATIENT
Start: 2023-03-29 | End: 2023-03-29

## 2023-03-29 RX ORDER — SODIUM CHLORIDE 9 MG/ML
1000 INJECTION, SOLUTION INTRAVENOUS
Refills: 0 | Status: DISCONTINUED | OUTPATIENT
Start: 2023-03-29 | End: 2023-03-29

## 2023-03-29 RX ORDER — CHLORHEXIDINE GLUCONATE 213 G/1000ML
1 SOLUTION TOPICAL ONCE
Refills: 0 | Status: DISCONTINUED | OUTPATIENT
Start: 2023-03-29 | End: 2023-03-29

## 2023-03-29 RX ORDER — AMPICILLIN TRIHYDRATE 250 MG
1 CAPSULE ORAL EVERY 4 HOURS
Refills: 0 | Status: DISCONTINUED | OUTPATIENT
Start: 2023-03-29 | End: 2023-03-29

## 2023-03-29 RX ORDER — MAGNESIUM SULFATE 500 MG/ML
4 VIAL (ML) INJECTION ONCE
Refills: 0 | Status: COMPLETED | OUTPATIENT
Start: 2023-03-29 | End: 2023-03-29

## 2023-03-29 RX ORDER — OXYTOCIN 10 UNIT/ML
333.33 VIAL (ML) INJECTION
Qty: 20 | Refills: 0 | Status: DISCONTINUED | OUTPATIENT
Start: 2023-03-29 | End: 2023-04-02

## 2023-03-29 RX ORDER — AMPICILLIN TRIHYDRATE 250 MG
2 CAPSULE ORAL ONCE
Refills: 0 | Status: COMPLETED | OUTPATIENT
Start: 2023-03-29 | End: 2023-03-29

## 2023-03-29 RX ORDER — CITRIC ACID/SODIUM CITRATE 300-500 MG
30 SOLUTION, ORAL ORAL ONCE
Refills: 0 | Status: DISCONTINUED | OUTPATIENT
Start: 2023-03-29 | End: 2023-03-30

## 2023-03-29 RX ORDER — CHLORHEXIDINE GLUCONATE 213 G/1000ML
1 SOLUTION TOPICAL ONCE
Refills: 0 | Status: DISCONTINUED | OUTPATIENT
Start: 2023-03-29 | End: 2023-03-30

## 2023-03-29 RX ORDER — OXYTOCIN 10 UNIT/ML
333.33 VIAL (ML) INJECTION
Qty: 20 | Refills: 0 | Status: COMPLETED | OUTPATIENT
Start: 2023-03-29 | End: 2023-03-29

## 2023-03-29 RX ADMIN — Medication 300 GRAM(S): at 10:56

## 2023-03-29 RX ADMIN — SODIUM CHLORIDE 125 MILLILITER(S): 9 INJECTION, SOLUTION INTRAVENOUS at 13:08

## 2023-03-29 RX ADMIN — Medication 108 GRAM(S): at 18:33

## 2023-03-29 RX ADMIN — Medication 12 MILLIGRAM(S): at 11:12

## 2023-03-29 RX ADMIN — Medication 108 GRAM(S): at 14:42

## 2023-03-29 RX ADMIN — MORPHINE SULFATE 2 MILLIGRAM(S): 50 CAPSULE, EXTENDED RELEASE ORAL at 12:00

## 2023-03-29 RX ADMIN — Medication 50 GM/HR: at 11:16

## 2023-03-29 RX ADMIN — MORPHINE SULFATE 2 MILLIGRAM(S): 50 CAPSULE, EXTENDED RELEASE ORAL at 11:16

## 2023-03-29 RX ADMIN — Medication 200 GRAM(S): at 11:06

## 2023-03-29 NOTE — CONSULT NOTE ADULT - SUBJECTIVE AND OBJECTIVE BOX
JOSÉ ANTONIO SHOEMAKER  32y  at 29w6d GA by LMP consistent with first trimester sono who presented to L&D with LOF and painful ctx.     EMPERATRIZ: 23  LMP: 22  Prenatal course is significant for:  1. Shortened cervix: 0.5cm on TVUS on . Hospitalized on for ACS- complete as of 3/9/ 2023.   2. H/o GDM in prior pregnancy     POB:   G1 SAB with D&C   G2 full term uncomplicated    G3 full term uncomplicated    G4 full term uncomplicated , pregnancy significant for GDM  PGYN: -fibroids, -ovarian cysts, denies STD hx, denies abnormal PAPs   PMH: Denies  PSH: Denies  SH: Denies EtOH, tobacco and illicit drug use during this pregnancy; feels safe at home   Meds: PNVs  Allergies: NKDA        SUBJECTIVE:    REVIEW OF SYSTEMS:    CONSTITUTIONAL: No weakness, fevers or chills  EYES/ENT: No visual changes;  No vertigo or throat pain   NECK: No pain or stiffness  RESPIRATORY: No cough, wheezing, hemoptysis; No shortness of breath  CARDIOVASCULAR: No chest pain or palpitations  GASTROINTESTINAL: No epigastric pain. No nausea, vomiting, or hematemesis; No diarrhea or constipation. No melena or hematochezia.  GENITOURINARY: No dysuria, frequency or hematuria  NEUROLOGICAL: No numbness or weakness  SKIN: No itching, burning, rashes, or lesions   All other review of systems is negative unless indicated above.      Vital Signs:  Vital Signs Last 24 Hrs  T(C): 36.6 (29 Mar 2023 12:03), Max: 36.6 (29 Mar 2023 12:03)  T(F): 97.9 (29 Mar 2023 12:03), Max: 97.9 (29 Mar 2023 12:03)  HR: 111 (29 Mar 2023 13:18) (104 - 130)  BP: 98/- (29 Mar 2023 13:18) (89/53 - 126/83)  BP(mean): --  RR: 20 (29 Mar 2023 12:03) (20 - 20)  SpO2: 98% (29 Mar 2023 13:16) (94% - 100%)    Parameters below as of 29 Mar 2023 12:03  Patient On (Oxygen Delivery Method): room air      Height (cm): 162.6 (23 @ 12:03)  Weight (kg): 76.2 (23 @ 12:03)  BMI (kg/m2): 28.8 (23 @ 12:03)  BSA (m2): 1.82 (23 @ 12:03)    Physical Exam:  General: Adult female appears uncomfortable  Abd: Soft, gravid  Ext: non-tender, non-edematous  SVE: 4/50/-3 on initial presentation    Labs:                          11.8   12.87 )-----------( 183      ( 29 Mar 2023 10:46 )             35.4           MEDICATIONS  (STANDING):  betamethasone Injectable 12 milliGRAM(s) IntraMuscular every 24 hours

## 2023-03-29 NOTE — OB RN DELIVERY SUMMARY - BABY A: APGAR 1 MIN COLOR, DELIVERY
"-- DO NOT REPLY / DO NOT REPLY ALL --  -- Message is from the [employer]--    COVID-19 Universal Screening: Negative    Referral Request  Name of Specialist: [facility]   Provider's specialty: Ophthalmology    Medical condition for referral:  Eye Exam Follow up - appt 07/05/2020    Is this a NEW request?: yes      Referral ordered by: Dr Tigist Jonas type:       Payor: [id number] / Plan: [id number] / Product Type: [employer] Risk      Preferred Delivery Method   Fax - number to send to: [employer] Information       Type Contact Phone    06/15/2020 09:45 AM Phone (Incoming) Luma Paris (Self) [phone] (M)        Turnaround time given to caller: ""This message will be sent to [facility] Provider's full name]. The clinical team will return your call as soon as they review your message. Typically, it takes 3 business days to process referral requests. \""  " (1) body pink, extremities blue

## 2023-03-29 NOTE — OB RN PATIENT PROFILE - NS_OBGYNHISTORY_OBGYN_ALL_OB_FT
mis x1 (14 yrs ago)   x3 (, , )  chronic migraines (takes advil)  dx: one month ago with a short cervix

## 2023-03-29 NOTE — OB RN PATIENT PROFILE - FUNCTIONAL ASSESSMENT - BASIC MOBILITY 6.
4-calculated by average/Not able to assess (calculate score using [facility] averaging method)

## 2023-03-29 NOTE — OB PROVIDER H&P - ASSESSMENT
A/P: Patient is a 31yo  at 29w6d GA who is being admitted to L&D for suspected  labor.   -Admit, consent   -Labs, vaginal swabs   -SVE /2  -Continuous FHT and tocometry   -GBS unknown, ampicillin ordered   -MgSO4 for fetal neuroprotection in the setting of delivery within 24 hours   -Qualifies for rescue ACS   -MFM and NICU consults     Discussed with Dr. Walker.

## 2023-03-29 NOTE — OB RN DELIVERY SUMMARY - NSSELHIDDEN_OBGYN_ALL_OB_FT
[NS_DeliveryAttending1_OBGYN_ALL_OB_FT:[id number]==],[NS_DeliveryRN_OBGYN_ALL_OB_FT:[id number]] [NS_DeliveryAttending1_OBGYN_ALL_OB_FT:[id number]==],[NS_DeliveryRN_OBGYN_ALL_OB_FT:[id number]],[NS_DeliveryAssist1_OBGYN_ALL_OB_FT:[id number]=]

## 2023-03-29 NOTE — OB RN PATIENT PROFILE - FALL HARM RISK - UNIVERSAL INTERVENTIONS
Bed in lowest position, wheels locked, appropriate side rails in place/Call bell, personal items and telephone in reach/Instruct patient to call for assistance before getting out of bed or chair/Non-slip footwear when patient is out of bed/[place] to call system/Physically safe environment - no spills, clutter or unnecessary equipment/Purposeful Proactive Rounding/Room/bathroom lighting operational, light cord in reach

## 2023-03-29 NOTE — OB RN DELIVERY SUMMARY - NS_LABORCHARACTER_OBGYN_ALL_OB
External electronic FM/Antibiotics in labor Induction of labor-AROM/External electronic FM/Antibiotics in labor/Steroids in labor

## 2023-03-29 NOTE — OB RN PATIENT PROFILE - TOBACCO USE
Intubation    Date/Time: 1/5/2023 7:56 AM  Performed by: Moncho Ta CRNA  Authorized by: Savita Bynum MD     Intubation:     Induction:  Intravenous    Intubated:  Postinduction    Mask Ventilation:  Moderately difficult with oral airway (beard)    Attempts:  1    Attempted By:  CRNA    Method of Intubation:  Video laryngoscopy    Blade:  Albarran 3    Laryngeal View Grade: Grade I - full view of cords      Difficult Airway Encountered?: No      Complications:  None    Airway Device:  Oral endotracheal tube    Airway Device Size:  7.5    Style/Cuff Inflation:  Cuffed (inflated to minimal occlusive pressure)    Tube secured:  23    Secured at:  The lips    Placement Verified By:  Capnometry    Complicating Factors:  None    Findings Post-Intubation:  BS equal bilateral and atraumatic/condition of teeth unchanged     Never smoker

## 2023-03-29 NOTE — CONSULT NOTE ADULT - PROBLEM SELECTOR RECOMMENDATION 9
- Admit to L&D  - Consent  - Admission Labs  - IV fluids   - Continuous toco and fetal monitoring   - GBS unknown- ampicillin prophylaxis   - MgSO4 for neuroprotection   - ACS course greater than 14 days ago, will give rescue dose of betamethasone   - Expectant management, epidural if desires

## 2023-03-29 NOTE — OB PROVIDER H&P - ATTENDING COMMENTS
at 29w6d admitted for  labor with 4cm dilation, prior admission for short cervix, s/p BMZ course  . GBSunk. Maternal/fetal status reassuring    -admit  -rescue BMZ  -Mag sulfate for neuroprotection  -routine admissions for  labor  -unable to transfer due to labor, ai and advance dilation  -consents explained and signed  -all questions and concerns answered to patient's satisfaction  ppalos

## 2023-03-29 NOTE — OB PROVIDER H&P - HISTORY OF PRESENT ILLNESS
Patient is a 31yo  at 29w6d GA who came to L&D for contractions. Contractions began at 0200, have been in creasing in intensity and frequency and is now occurring every 5 minutes.   Prenatal course is significant for:  1. Shortened cervix: 0.5cm on TVUS on  s/p ACS (3/8-3/9)  2. H/o GDM in prior pregnancy     POB:   G1 SAB with D&C   G2 full term uncomplicated    G3 full term uncomplicated    G4 full term uncomplicated , pregnancy significant for GDM  PGYN: -fibroids, -ovarian cysts, denies STD hx, denies abnormal PAPs   PMH: Denies  PSH: Denies  SH: Denies EtOH, tobacco and illicit drug use during this pregnancy; feels safe at home   Meds: PNVs  Allergies: NKDA Patient is a 31yo  at 29w6d GA who came to L&D for contractions. Contractions began at 0200, have been increasing in intensity and frequency and is now occurring every 5 minutes.   Prenatal course is significant for:  1. Shortened cervix: 0.5cm on TVUS on  s/p ACS (3/8-3/9)  2. H/o GDM in prior pregnancy     POB:   G1 SAB with D&C   G2 full term uncomplicated    G3 full term uncomplicated    G4 full term uncomplicated , pregnancy significant for GDM  PGYN: -fibroids, -ovarian cysts, denies STD hx, denies abnormal PAPs   PMH: Denies  PSH: Denies  SH: Denies EtOH, tobacco and illicit drug use during this pregnancy; feels safe at home   Meds: PNVs  Allergies: NKDA

## 2023-03-30 ENCOUNTER — TRANSCRIPTION ENCOUNTER (OUTPATIENT)
Age: 33
End: 2023-03-30

## 2023-03-30 LAB
CANDIDA AB TITR SER: SIGNIFICANT CHANGE UP
CULTURE RESULTS: SIGNIFICANT CHANGE UP
G VAGINALIS DNA SPEC QL NAA+PROBE: SIGNIFICANT CHANGE UP
HIV 1+2 AB+HIV1 P24 AG SERPL QL IA: SIGNIFICANT CHANGE UP
MAGNESIUM SERPL-MCNC: 6 MG/DL — HIGH (ref 1.6–2.6)
MEV IGG SER-ACNC: 13.9 AU/ML — SIGNIFICANT CHANGE UP
MEV IGG+IGM SER-IMP: SIGNIFICANT CHANGE UP
SPECIMEN SOURCE: SIGNIFICANT CHANGE UP
T VAGINALIS SPEC QL WET PREP: SIGNIFICANT CHANGE UP

## 2023-03-30 PROCEDURE — 88307 TISSUE EXAM BY PATHOLOGIST: CPT | Mod: 26

## 2023-03-30 RX ORDER — AER TRAVELER 0.5 G/1
1 SOLUTION RECTAL; TOPICAL EVERY 4 HOURS
Refills: 0 | Status: DISCONTINUED | OUTPATIENT
Start: 2023-03-30 | End: 2023-04-02

## 2023-03-30 RX ORDER — IBUPROFEN 200 MG
600 TABLET ORAL EVERY 6 HOURS
Refills: 0 | Status: COMPLETED | OUTPATIENT
Start: 2023-03-30 | End: 2024-02-26

## 2023-03-30 RX ORDER — KETOROLAC TROMETHAMINE 30 MG/ML
30 SYRINGE (ML) INJECTION ONCE
Refills: 0 | Status: DISCONTINUED | OUTPATIENT
Start: 2023-03-30 | End: 2023-03-30

## 2023-03-30 RX ORDER — PRAMOXINE HYDROCHLORIDE 150 MG/15G
1 AEROSOL, FOAM RECTAL EVERY 4 HOURS
Refills: 0 | Status: DISCONTINUED | OUTPATIENT
Start: 2023-03-30 | End: 2023-04-02

## 2023-03-30 RX ORDER — DIPHENHYDRAMINE HCL 50 MG
25 CAPSULE ORAL EVERY 6 HOURS
Refills: 0 | Status: DISCONTINUED | OUTPATIENT
Start: 2023-03-30 | End: 2023-04-02

## 2023-03-30 RX ORDER — IBUPROFEN 200 MG
600 TABLET ORAL EVERY 6 HOURS
Refills: 0 | Status: DISCONTINUED | OUTPATIENT
Start: 2023-03-30 | End: 2023-03-30

## 2023-03-30 RX ORDER — OXYCODONE HYDROCHLORIDE 5 MG/1
5 TABLET ORAL ONCE
Refills: 0 | Status: DISCONTINUED | OUTPATIENT
Start: 2023-03-30 | End: 2023-04-02

## 2023-03-30 RX ORDER — LANOLIN
1 OINTMENT (GRAM) TOPICAL EVERY 6 HOURS
Refills: 0 | Status: DISCONTINUED | OUTPATIENT
Start: 2023-03-30 | End: 2023-04-02

## 2023-03-30 RX ORDER — HYDROCORTISONE 1 %
1 OINTMENT (GRAM) TOPICAL EVERY 6 HOURS
Refills: 0 | Status: DISCONTINUED | OUTPATIENT
Start: 2023-03-30 | End: 2023-04-02

## 2023-03-30 RX ORDER — OXYCODONE HYDROCHLORIDE 5 MG/1
5 TABLET ORAL
Refills: 0 | Status: DISCONTINUED | OUTPATIENT
Start: 2023-03-30 | End: 2023-04-02

## 2023-03-30 RX ORDER — DIBUCAINE 1 %
1 OINTMENT (GRAM) RECTAL EVERY 6 HOURS
Refills: 0 | Status: DISCONTINUED | OUTPATIENT
Start: 2023-03-30 | End: 2023-04-02

## 2023-03-30 RX ORDER — TETANUS TOXOID, REDUCED DIPHTHERIA TOXOID AND ACELLULAR PERTUSSIS VACCINE, ADSORBED 5; 2.5; 8; 8; 2.5 [IU]/.5ML; [IU]/.5ML; UG/.5ML; UG/.5ML; UG/.5ML
0.5 SUSPENSION INTRAMUSCULAR ONCE
Refills: 0 | Status: DISCONTINUED | OUTPATIENT
Start: 2023-03-30 | End: 2023-04-02

## 2023-03-30 RX ORDER — OXYTOCIN 10 UNIT/ML
41.67 VIAL (ML) INJECTION
Qty: 20 | Refills: 0 | Status: DISCONTINUED | OUTPATIENT
Start: 2023-03-30 | End: 2023-04-02

## 2023-03-30 RX ORDER — MAGNESIUM HYDROXIDE 400 MG/1
30 TABLET, CHEWABLE ORAL
Refills: 0 | Status: DISCONTINUED | OUTPATIENT
Start: 2023-03-30 | End: 2023-04-02

## 2023-03-30 RX ORDER — ACETAMINOPHEN 500 MG
1000 TABLET ORAL ONCE
Refills: 0 | Status: COMPLETED | OUTPATIENT
Start: 2023-03-30 | End: 2023-03-30

## 2023-03-30 RX ORDER — SIMETHICONE 80 MG/1
80 TABLET, CHEWABLE ORAL EVERY 4 HOURS
Refills: 0 | Status: DISCONTINUED | OUTPATIENT
Start: 2023-03-30 | End: 2023-04-02

## 2023-03-30 RX ORDER — IBUPROFEN 200 MG
600 TABLET ORAL EVERY 6 HOURS
Refills: 0 | Status: DISCONTINUED | OUTPATIENT
Start: 2023-03-30 | End: 2023-04-02

## 2023-03-30 RX ORDER — BENZOCAINE 10 %
1 GEL (GRAM) MUCOUS MEMBRANE EVERY 6 HOURS
Refills: 0 | Status: DISCONTINUED | OUTPATIENT
Start: 2023-03-30 | End: 2023-04-02

## 2023-03-30 RX ORDER — ACETAMINOPHEN 500 MG
975 TABLET ORAL
Refills: 0 | Status: DISCONTINUED | OUTPATIENT
Start: 2023-03-30 | End: 2023-04-02

## 2023-03-30 RX ORDER — SODIUM CHLORIDE 9 MG/ML
3 INJECTION INTRAMUSCULAR; INTRAVENOUS; SUBCUTANEOUS EVERY 8 HOURS
Refills: 0 | Status: DISCONTINUED | OUTPATIENT
Start: 2023-03-30 | End: 2023-04-02

## 2023-03-30 RX ORDER — DIPHENHYDRAMINE HCL 50 MG
25 CAPSULE ORAL EVERY 6 HOURS
Refills: 0 | Status: COMPLETED | OUTPATIENT
Start: 2023-03-30 | End: 2024-02-26

## 2023-03-30 RX ADMIN — Medication 108 GRAM(S): at 01:07

## 2023-03-30 RX ADMIN — Medication 1000 MILLIUNIT(S)/MIN: at 04:43

## 2023-03-30 RX ADMIN — SODIUM CHLORIDE 3 MILLILITER(S): 9 INJECTION INTRAMUSCULAR; INTRAVENOUS; SUBCUTANEOUS at 21:48

## 2023-03-30 RX ADMIN — Medication 975 MILLIGRAM(S): at 20:18

## 2023-03-30 RX ADMIN — Medication 30 MILLIGRAM(S): at 06:06

## 2023-03-30 RX ADMIN — Medication 600 MILLIGRAM(S): at 12:56

## 2023-03-30 RX ADMIN — Medication 1 TABLET(S): at 12:56

## 2023-03-30 RX ADMIN — Medication 25 MILLIGRAM(S): at 20:17

## 2023-03-30 RX ADMIN — SODIUM CHLORIDE 3 MILLILITER(S): 9 INJECTION INTRAMUSCULAR; INTRAVENOUS; SUBCUTANEOUS at 14:30

## 2023-03-30 RX ADMIN — Medication 0.2 MILLIGRAM(S): at 04:48

## 2023-03-30 RX ADMIN — Medication 600 MILLIGRAM(S): at 17:33

## 2023-03-30 RX ADMIN — SODIUM CHLORIDE 3 MILLILITER(S): 9 INJECTION INTRAMUSCULAR; INTRAVENOUS; SUBCUTANEOUS at 07:09

## 2023-03-30 RX ADMIN — Medication 975 MILLIGRAM(S): at 09:53

## 2023-03-30 RX ADMIN — Medication 400 MILLIGRAM(S): at 02:48

## 2023-03-30 RX ADMIN — Medication 600 MILLIGRAM(S): at 23:48

## 2023-03-30 NOTE — DISCHARGE NOTE OB - CARE PROVIDER_API CALL
[facility],   [address] [place] [address]  Phone: [phone]  Fax: (   )    -  Established Patient  Follow Up Time:

## 2023-03-30 NOTE — OB PROVIDER DELIVERY SUMMARY - NSPROVIDERDELIVERYNOTE_OBGYN_ALL_OB_FT
Patient felt rectal pressure and was found to be fully dilated, 0 station. She pushed effectively for 10 minutes, and delivered a viable female infant at 0438. Vertex delivered without difficulty, no nuchal cord noted, both shoulders then delivered without difficulty.  Delayed cord clamping for approximately 10 seconds, and infant was handed to awaiting pediatricians. Cord segment was clamped and cut for cord blood and gas collection. Placenta delivered spontaneously and intact at 0443. Pitocin started. Uterus, cervix, perineum and vagina were inspected and no lacerations were noted. Excellent hemostasis was achieved. Apgars 7,9.

## 2023-03-30 NOTE — DISCHARGE NOTE OB - MEDICATION SUMMARY - MEDICATIONS TO TAKE
I will START or STAY ON the medications listed below when I get home from the hospital:    acetaminophen 325 mg oral tablet  -- 3 tab(s) by mouth every 6 hours as needed for pain  -- Indication: For Pain    ibuprofen 600 mg oral tablet  -- 1 tab(s) by mouth every 6 hours as needed for pain  -- Indication: For Pain    Pepcid 20 mg oral tablet  -- 1 tab(s) by mouth once a day   -- It is very important that you take or use this exactly as directed.  Do not skip doses or discontinue unless directed by your doctor.  Obtain medical advice before taking any non-prescription drugs as some may affect the action of this medication.    -- Indication: For home med    Prenatal Multivitamins with Folic Acid 1 mg oral tablet  -- 1 tab(s) by mouth once a day  -- Indication: For Postpartum recovery

## 2023-03-30 NOTE — OB PROVIDER DELIVERY SUMMARY - NSSELHIDDEN_OBGYN_ALL_OB_FT
[NS_DeliveryAttending1_OBGYN_ALL_OB_FT:[id number]==],[NS_DeliveryRN_OBGYN_ALL_OB_FT:[id number]],[NS_DeliveryAssist1_OBGYN_ALL_OB_FT:[id number]=]

## 2023-03-30 NOTE — OB PROVIDER LABOR PROGRESS NOTE - NS_SUBJECTIVE/OBJECTIVE_OBGYN_ALL_OB_FT
Patient is a 31yo  in  labor at 29w6d. Patient resting comfortably at bedside. She denies feeling increased pressure or pain. Bedside sono reconfirms cephalic presentation
Patient reports pressure

## 2023-03-30 NOTE — DISCHARGE NOTE OB - PATIENT PORTAL LINK FT
You can access the FollowMyHealth Patient Portal offered by [facility] by registering at the following website: [email or web address]. By joining [employer]’s FollowMyHealth portal, you will also be able to view your health information using other applications (apps) compatible with our system.

## 2023-03-30 NOTE — DISCHARGE NOTE OB - HOSPITAL COURSE
She is a 31yo now  who presented at 30w GA in active labor and had a normal delivery. She had a normal postpartum course and was discharged home in stable condition. Upon discharge she was voiding, tolerating PO, and ambulating.  She is a 33yo now  who presented at 30w GA in active labor and had a normal delivery. She also had a postpartum bilateral salpingectomy on postpartum day 4. She had a normal postpartum course and was discharged home in stable condition. Upon discharge she was voiding, tolerating PO, and ambulating.

## 2023-03-30 NOTE — DISCHARGE NOTE OB - CARE PLAN
Principal Discharge DX:	 labor with  delivery  Assessment and plan of treatment:	Patient should transition to regular activity level. Resume regular diet. Patient should follow up with her OB for a postpartum checkup 3-6 weeks after delivery. Patient should call her doctor sooner if she develops a fever or uncontrolled vaginal bleeding. Please call sooner if there are any other concerns.   1 Principal Discharge DX:	 labor with  delivery  Assessment and plan of treatment:	Patient should transition to regular activity level. Resume regular diet. Patient should follow up with her OB for a postpartum checkup 3-6 weeks after delivery. Patient should call her doctor sooner if she develops a fever or uncontrolled vaginal bleeding. Please call sooner if there are any other concerns.  Secondary Diagnosis:	Status post bilateral salpingectomy

## 2023-03-30 NOTE — DISCHARGE NOTE OB - PROVIDER TOKENS
FREE:[LAST:[[facility]],PHONE:[phone]],FAX:[(   )    -],ADDRESS:[[address] [place] [address]],ESTABLISHEDPATIENT:[T]]

## 2023-03-30 NOTE — OB PROVIDER LABOR PROGRESS NOTE - ASSESSMENT
32 y.o.  at 29w6d admitted in  labor. Cat 1 FHT.     - continue expectant management  - continuous toco and fetal heart monitoring    D/w Dr. Navarro
Category I tracing  Continuous fetal and toco monitoring  Continue expectant management. No contractions on toco. Defer AROM.  Repeat SVE when reporting more pressure

## 2023-03-30 NOTE — DISCHARGE NOTE OB - NS MD DC FALL RISK RISK
For information on Fall & Injury Prevention, visit: [email or web address] OR  [email or web address] OR  https://www.cdc.gov/steadi/patient.html

## 2023-03-31 ENCOUNTER — TRANSCRIPTION ENCOUNTER (OUTPATIENT)
Age: 33
End: 2023-03-31

## 2023-03-31 LAB
CULTURE RESULTS: SIGNIFICANT CHANGE UP
HCT VFR BLD CALC: 26.2 % — LOW (ref 34.5–45)
HGB BLD-MCNC: 8.9 G/DL — LOW (ref 11.5–15.5)
SPECIMEN SOURCE: SIGNIFICANT CHANGE UP

## 2023-03-31 RX ADMIN — SODIUM CHLORIDE 3 MILLILITER(S): 9 INJECTION INTRAMUSCULAR; INTRAVENOUS; SUBCUTANEOUS at 16:02

## 2023-03-31 RX ADMIN — Medication 600 MILLIGRAM(S): at 17:39

## 2023-03-31 RX ADMIN — Medication 1 TABLET(S): at 11:51

## 2023-03-31 RX ADMIN — Medication 600 MILLIGRAM(S): at 05:00

## 2023-03-31 RX ADMIN — SODIUM CHLORIDE 3 MILLILITER(S): 9 INJECTION INTRAMUSCULAR; INTRAVENOUS; SUBCUTANEOUS at 05:04

## 2023-03-31 RX ADMIN — Medication 975 MILLIGRAM(S): at 14:44

## 2023-03-31 RX ADMIN — Medication 600 MILLIGRAM(S): at 11:51

## 2023-03-31 RX ADMIN — Medication 600 MILLIGRAM(S): at 23:47

## 2023-04-01 ENCOUNTER — TRANSCRIPTION ENCOUNTER (OUTPATIENT)
Age: 33
End: 2023-04-01

## 2023-04-01 LAB
HCT VFR BLD CALC: 28.4 % — LOW (ref 34.5–45)
HGB BLD-MCNC: 9.4 G/DL — LOW (ref 11.5–15.5)

## 2023-04-01 PROCEDURE — 88302 TISSUE EXAM BY PATHOLOGIST: CPT | Mod: 26

## 2023-04-01 RX ORDER — KETOROLAC TROMETHAMINE 30 MG/ML
30 SYRINGE (ML) INJECTION ONCE
Refills: 0 | Status: DISCONTINUED | OUTPATIENT
Start: 2023-04-01 | End: 2023-04-01

## 2023-04-01 RX ORDER — CITRIC ACID/SODIUM CITRATE 300-500 MG
30 SOLUTION, ORAL ORAL ONCE
Refills: 0 | Status: COMPLETED | OUTPATIENT
Start: 2023-04-01 | End: 2023-04-01

## 2023-04-01 RX ORDER — ACETAMINOPHEN 500 MG
3 TABLET ORAL
Qty: 48 | Refills: 0
Start: 2023-04-01 | End: 2023-04-04

## 2023-04-01 RX ORDER — ACETAMINOPHEN 500 MG
975 TABLET ORAL ONCE
Refills: 0 | Status: COMPLETED | OUTPATIENT
Start: 2023-04-01 | End: 2023-04-01

## 2023-04-01 RX ORDER — FAMOTIDINE 10 MG/ML
20 INJECTION INTRAVENOUS ONCE
Refills: 0 | Status: COMPLETED | OUTPATIENT
Start: 2023-04-01 | End: 2023-04-01

## 2023-04-01 RX ORDER — SODIUM CHLORIDE 9 MG/ML
1000 INJECTION, SOLUTION INTRAVENOUS
Refills: 0 | Status: DISCONTINUED | OUTPATIENT
Start: 2023-04-01 | End: 2023-04-02

## 2023-04-01 RX ORDER — IBUPROFEN 200 MG
1 TABLET ORAL
Qty: 16 | Refills: 0
Start: 2023-04-01 | End: 2023-04-04

## 2023-04-01 RX ADMIN — Medication 600 MILLIGRAM(S): at 23:55

## 2023-04-01 RX ADMIN — Medication 975 MILLIGRAM(S): at 16:04

## 2023-04-01 RX ADMIN — OXYCODONE HYDROCHLORIDE 5 MILLIGRAM(S): 5 TABLET ORAL at 19:27

## 2023-04-01 RX ADMIN — Medication 30 MILLILITER(S): at 15:33

## 2023-04-01 RX ADMIN — SODIUM CHLORIDE 125 MILLILITER(S): 9 INJECTION, SOLUTION INTRAVENOUS at 15:25

## 2023-04-01 RX ADMIN — FAMOTIDINE 20 MILLIGRAM(S): 10 INJECTION INTRAVENOUS at 15:33

## 2023-04-01 RX ADMIN — Medication 975 MILLIGRAM(S): at 03:15

## 2023-04-01 RX ADMIN — Medication 30 MILLIGRAM(S): at 17:14

## 2023-04-01 RX ADMIN — SODIUM CHLORIDE 3 MILLILITER(S): 9 INJECTION INTRAMUSCULAR; INTRAVENOUS; SUBCUTANEOUS at 21:00

## 2023-04-01 RX ADMIN — Medication 30 MILLIGRAM(S): at 15:34

## 2023-04-01 RX ADMIN — Medication 975 MILLIGRAM(S): at 15:34

## 2023-04-01 NOTE — OB RN INTRAOPERATIVE NOTE - NSSKININCISION1_OBGYN_ALL_OB_DT
[FreeTextEntry1] : The patient here for evaluation of high blood pressure. Patient is currently tolerating the current antihypertensive regime and they deny headaches, stiff neck, visual changes, or PND. The patient has been trying to stay on a low-sodium diet.\par \par The patient presents today for evaluation of complaints of leg Edema. They admit to high dietary sodium intake recently. The patient denies any chest pain, shortness of breath, PND. Orthopnea, dizziness, nausea, vomiting, lightheadedness, abdominal pain, or fever. \par \par Pt states today that they had both covid 19 vaccine . pt had the COVID-19 vaccine without major side effects. The patient did experience mild fatigue headache and arm soreness. The patient denied any fever over 100.5. pt denies any recent sore throat, fever, chills loss of taste or smell. \par \par Pt states that she sometimes feels pressure and "swelling" b/l temporal area.
01-Apr-2023 16:42

## 2023-04-01 NOTE — OB RN INTRAOPERATIVE NOTE - NSOBSELHIDDEN_OBGYN_ALL_OB_FT
[NSOBAttendingProcedure1_OBGYN_ALL_OB_FT:[id number]],[NSRNCirculatorProcedure1_OBGYN_ALL_OB_FT:[id number]==]

## 2023-04-01 NOTE — OB RN INTRAOPERATIVE NOTE - NSANESTHESIOLOGISTPROCEDURE1_OBGYN_ALL_OB_FT
ELISA Garrido at bedside examining patient.
Patient arrived via POV, AAOx4, ambulatory with steady gait. Patient c/c of 
rash to chest and bilateral upper extremities. Patient states no changes in 
laundry soap, soap for hair, and changes in foods. Patient states itching is 
bothersome and not really painful. Patient states no relief from cold 
compresses, but from cold showers temporary relief.
Patient given written and verbal discharge instructions and verbalizes 
understanding.  ER MD discussed with patient the results and treatment 
provided. Patient in stable condition. ID arm band removed. 

Rx of Prednisone and Benadryl given. Patient educated on pain management and to 
follow up with PMD. Pain Scale 2/10.

Opportunity for questions provided and answered. Medication side effect fact 
sheet provided.
Patient to ER bed 5 to gown for evaluation. Side rails up.

Assumed care.
MD Pedro

## 2023-04-01 NOTE — OB RN INTRAOPERATIVE NOTE - NSSELHIDDEN_OBGYN_ALL_OB_FT
[NS_DeliveryAttending1_OBGYN_ALL_OB_FT:[id number]],[NS_DeliveryRN_OBGYN_ALL_OB_FT:[id number]],[NS_DeliveryAssist1_OBGYN_ALL_OB_FT:[id number]=]

## 2023-04-01 NOTE — PROGRESS NOTE ADULT - ATTENDING COMMENTS
PPD#2  Doing well  meeting milestones  Hgb 11.8 --> 8.9- no signs or sx's of anemia  BTL today- most likely discharge tomorrow   routine post partum care  ppalos

## 2023-04-02 VITALS
HEART RATE: 69 BPM | SYSTOLIC BLOOD PRESSURE: 99 MMHG | OXYGEN SATURATION: 98 % | TEMPERATURE: 98 F | DIASTOLIC BLOOD PRESSURE: 69 MMHG | RESPIRATION RATE: 18 BRPM

## 2023-04-02 PROCEDURE — 86780 TREPONEMA PALLIDUM: CPT

## 2023-04-02 PROCEDURE — 81001 URINALYSIS AUTO W/SCOPE: CPT

## 2023-04-02 PROCEDURE — 87800 DETECT AGNT MULT DNA DIREC: CPT

## 2023-04-02 PROCEDURE — 80307 DRUG TEST PRSMV CHEM ANLYZR: CPT

## 2023-04-02 PROCEDURE — 82962 GLUCOSE BLOOD TEST: CPT

## 2023-04-02 PROCEDURE — 87070 CULTURE OTHR SPECIMN AEROBIC: CPT

## 2023-04-02 PROCEDURE — 85018 HEMOGLOBIN: CPT

## 2023-04-02 PROCEDURE — T1013: CPT

## 2023-04-02 PROCEDURE — 87491 CHLMYD TRACH DNA AMP PROBE: CPT

## 2023-04-02 PROCEDURE — 85025 COMPLETE CBC W/AUTO DIFF WBC: CPT

## 2023-04-02 PROCEDURE — 87389 HIV-1 AG W/HIV-1&-2 AB AG IA: CPT

## 2023-04-02 PROCEDURE — 36415 COLL VENOUS BLD VENIPUNCTURE: CPT

## 2023-04-02 PROCEDURE — 86765 RUBEOLA ANTIBODY: CPT

## 2023-04-02 PROCEDURE — 88307 TISSUE EXAM BY PATHOLOGIST: CPT

## 2023-04-02 PROCEDURE — 82731 ASSAY OF FETAL FIBRONECTIN: CPT

## 2023-04-02 PROCEDURE — 86901 BLOOD TYPING SEROLOGIC RH(D): CPT

## 2023-04-02 PROCEDURE — 86703 HIV-1/HIV-2 1 RESULT ANTBDY: CPT

## 2023-04-02 PROCEDURE — 86900 BLOOD TYPING SEROLOGIC ABO: CPT

## 2023-04-02 PROCEDURE — 88302 TISSUE EXAM BY PATHOLOGIST: CPT

## 2023-04-02 PROCEDURE — 83735 ASSAY OF MAGNESIUM: CPT

## 2023-04-02 PROCEDURE — 87591 N.GONORRHOEAE DNA AMP PROB: CPT

## 2023-04-02 PROCEDURE — 85014 HEMATOCRIT: CPT

## 2023-04-02 PROCEDURE — 87077 CULTURE AEROBIC IDENTIFY: CPT

## 2023-04-02 PROCEDURE — 86850 RBC ANTIBODY SCREEN: CPT

## 2023-04-02 PROCEDURE — 87086 URINE CULTURE/COLONY COUNT: CPT

## 2023-04-02 RX ADMIN — Medication 975 MILLIGRAM(S): at 09:09

## 2023-04-02 RX ADMIN — SODIUM CHLORIDE 3 MILLILITER(S): 9 INJECTION INTRAMUSCULAR; INTRAVENOUS; SUBCUTANEOUS at 05:51

## 2023-04-02 RX ADMIN — Medication 1 TABLET(S): at 12:19

## 2023-04-02 RX ADMIN — Medication 600 MILLIGRAM(S): at 05:47

## 2023-04-02 RX ADMIN — Medication 975 MILLIGRAM(S): at 03:08

## 2023-04-02 RX ADMIN — Medication 600 MILLIGRAM(S): at 12:18

## 2023-04-02 NOTE — PROGRESS NOTE ADULT - ASSESSMENT
JOSÉ ANTONIO SHOEMAKER is a 32y  now PPD#1 s/p spontaneous vaginal delivery at 30w secondary to  labor s/p cerclage removal, uncomplicated.    -Vital signs stable  -Hgb: 11.8 -> 8.9, started on PO iron  -Voiding, tolerating PO  -Advance care as tolerated   -Continue routine postpartum care and education  -Female infant in NICU doing well per patient  -Desires BTL, but last consent signed in . Patient states she signed consent in hospital but no consent in chart.  -Dispo: Continue inpatient management
JOSÉ ANTONIO SHOEMAKER is a 32y  now PPD#2 s/p spontaneous vaginal delivery at 30w secondary to  labor s/p cerclage removal, uncomplicated.    -Vital signs stable  -Hgb: 11.8 -> 8.9, started on PO iron -> f/u AM CBC  -Voiding, NPO for BTL today -> f/u AM CBC  -Advance care as tolerated   -Continue routine postpartum care and education  -Female infant in NICU doing well per patient  -Dispo: BTL today. Possible discharge s/p procedure
JOSÉ ANTONIO SHOEMAKER is a 32y  now PPD#3 s/p spontaneous vaginal delivery at 30w secondary to  labor s/p cerclage removal, uncomplicated and POD#1 s/p BS    -Vital signs stable  -Hgb: 11.8 -> 8.9 -> 9.4  -Voiding, tolerating PO  -Advance care as tolerated   -Continue routine postpartum care and education  -Female infant in NICU doing well per patient  -Dispo: Discharge home today pending attending approval

## 2023-04-02 NOTE — PROGRESS NOTE ADULT - ATTENDING COMMENTS
PPD3  no complaints  Hb 9.4 , asymptomatic anemia   using breast pump, baby in NICU   ambulating well  abd soft, ND, NT, uterus firm , NT, dressing removed, wound healthy , intact   ext no edema   breast N  plan   DC home   Post partum care discussed  f/up in clinic in 2 weeks

## 2023-04-04 LAB — SURGICAL PATHOLOGY STUDY: SIGNIFICANT CHANGE UP

## 2023-04-05 ENCOUNTER — APPOINTMENT (OUTPATIENT)
Dept: ANTEPARTUM | Facility: CLINIC | Age: 33
End: 2023-04-05

## 2023-04-06 LAB — SURGICAL PATHOLOGY STUDY: SIGNIFICANT CHANGE UP

## 2023-10-02 NOTE — OB PROVIDER H&P - NS_VBACATTEMPT_OBGYN_ALL_OB
Patient Specific Counseling (Will Not Stick From Patient To Patient): Patient maintained on Humira and topical steroids without side effects per patient. Advised patient to get labs done and a refill of his Humira will be sent to the pharmacy. Advised patient to f/u in 6 months
Detail Level: Generalized
N/A

## 2025-03-01 NOTE — OB RN PATIENT PROFILE - BIRTH SEX
pt s/p cardiac cath with access through right groin. pt complained that he was suddenly having sharp pain on groin site. LKW at 00:30 after pt ambulated to bathroom. right groin was c/d/i and site was soft to touch.
Female